# Patient Record
Sex: FEMALE | Race: WHITE | NOT HISPANIC OR LATINO
[De-identification: names, ages, dates, MRNs, and addresses within clinical notes are randomized per-mention and may not be internally consistent; named-entity substitution may affect disease eponyms.]

---

## 2022-06-23 DIAGNOSIS — Z00.00 ENCOUNTER FOR GENERAL ADULT MEDICAL EXAMINATION W/OUT ABNORMAL FINDINGS: ICD-10-CM

## 2022-06-27 ENCOUNTER — APPOINTMENT (OUTPATIENT)
Dept: OBGYN | Facility: CLINIC | Age: 77
End: 2022-06-27

## 2022-08-15 DIAGNOSIS — N95.1 MENOPAUSAL AND FEMALE CLIMACTERIC STATES: ICD-10-CM

## 2022-09-19 ENCOUNTER — APPOINTMENT (OUTPATIENT)
Dept: OBGYN | Facility: CLINIC | Age: 77
End: 2022-09-19

## 2022-11-08 ENCOUNTER — APPOINTMENT (OUTPATIENT)
Dept: OBGYN | Facility: CLINIC | Age: 77
End: 2022-11-08

## 2022-11-08 VITALS
WEIGHT: 144 LBS | HEART RATE: 61 BPM | DIASTOLIC BLOOD PRESSURE: 61 MMHG | BODY MASS INDEX: 23.14 KG/M2 | HEIGHT: 66 IN | SYSTOLIC BLOOD PRESSURE: 131 MMHG

## 2022-11-08 DIAGNOSIS — N76.0 ACUTE VAGINITIS: ICD-10-CM

## 2022-11-08 DIAGNOSIS — U07.1 COVID-19: ICD-10-CM

## 2022-11-08 PROCEDURE — G0101: CPT

## 2022-11-08 PROCEDURE — 99387 INIT PM E/M NEW PAT 65+ YRS: CPT | Mod: GY

## 2022-11-08 NOTE — HISTORY OF PRESENT ILLNESS
[FreeTextEntry1] : Patient is a 77-year-old  5 para 3-0-2-3 last menstrual period at age 43\par Patient presents for annual visit,,, complaining of some brown and yellow vaginal discharge and occasional pink-red spotting over the past couple of weeks

## 2022-11-08 NOTE — PHYSICAL EXAM
[Appropriately responsive] : appropriately responsive [Alert] : alert [No Acute Distress] : no acute distress [No Lymphadenopathy] : no lymphadenopathy [Regular Rate Rhythm] : regular rate rhythm [No Murmurs] : no murmurs [Clear to Auscultation B/L] : clear to auscultation bilaterally [Soft] : soft [Non-tender] : non-tender [Non-distended] : non-distended [No HSM] : No HSM [No Lesions] : no lesions [No Mass] : no mass [Oriented x3] : oriented x3 [Examination Of The Breasts] : a normal appearance [No Masses] : no breast masses were palpable [Vulvar Atrophy] : vulvar atrophy [Atrophy] : atrophy [Normal] : normal [Anteversion] : anteverted [Uterine Adnexae] : normal [Tenderness] : nontender [Mass ___ cm] : no uterine mass was palpated [FreeTextEntry1] : Severe atrophy [FreeTextEntry4] : Severe atrophy [FreeTextEntry5] : Flush with vaginal vault [FreeTextEntry6] : Small anteverted

## 2022-11-08 NOTE — PLAN
[FreeTextEntry1] : Patient is 77-year-old  5 para 3-0-2-3 last menstrual period age 43\par Patient presents for annual visit,,, states been having some vaginal discharge yellow and some spotting red blood over the past 2 weeks\par Physical exam reveals a well-developed well-nourished female no apparent distress,,, BMI 23\par Heart regular rhythm and rate, lungs clear, breast no mass nontender no skin is nipple discharge adenopathy, abdomen soft nontender no organomegaly\par Evidence of a skin lesion in the upper left abdominal wall area patient states has not changed in size shape or color at this time\par Pelvic exam shows normal female genitalia atrophic, vagina lesions atrophic, cervix flush to the vaginal vault nontender, uterus anteverted small nontender, adnexa no mass nontender.\par Pap smear performed since patient does express history of vaginal spotting over the past 2 weeks,,, to evaluate any possible cervical pathology\par Patient states she had a mammogram done in August of this year  and will forward results\par Patient will be given a prescription for bone density and also a pelvic sonogram to further evaluate the endometrial lining and the pelvic anatomy.  Patient states she was diagnosed with COVID back in  of this 2022 but denies any residual symptoms or deficits and denies being vaccinated\par Patient states she had colonoscopy 2 years prior with negative findings\par Patient will be contacted after pelvic sonogram results obtained for any further need of treatment for evaluation

## 2023-02-24 DIAGNOSIS — N95.0 POSTMENOPAUSAL BLEEDING: ICD-10-CM

## 2023-02-24 DIAGNOSIS — N85.02 ABNORMAL UTERINE AND VAGINAL BLEEDING, UNSPECIFIED: ICD-10-CM

## 2023-02-24 DIAGNOSIS — N93.9 ABNORMAL UTERINE AND VAGINAL BLEEDING, UNSPECIFIED: ICD-10-CM

## 2023-02-27 ENCOUNTER — APPOINTMENT (OUTPATIENT)
Dept: OBGYN | Facility: CLINIC | Age: 78
End: 2023-02-27
Payer: MEDICARE

## 2023-02-27 VITALS
BODY MASS INDEX: 24.27 KG/M2 | DIASTOLIC BLOOD PRESSURE: 71 MMHG | WEIGHT: 151 LBS | SYSTOLIC BLOOD PRESSURE: 130 MMHG | HEART RATE: 64 BPM | HEIGHT: 66 IN

## 2023-02-27 PROCEDURE — 58100 BIOPSY OF UTERUS LINING: CPT

## 2023-02-27 NOTE — PROCEDURE
[Endometrial Biopsy] : Endometrial biopsy [Thickened Endometrium] : thickened endometrium [Consent Obtained] : Consent obtained [Pre-op Evaluation] : Pre-op evaluation [Post-Menop. Bleeding] : post-menopausal bleeding [Risks] : risks [Alternatives] : alternatives [Bleeding] : bleeding [N/A] : pregnancy test not applicable [No Premedication] : No premedication [None] : none [Easy Passage] : Easy passage [Sounded to ___ cm] : sounded to [unfilled] ~Ucm [Anteverted] : anteverted [Scant] : scant [Specimen Collected] : collected [Sent to Pathology] : placed in buffered formalin and sent for pathology [Tolerated Well] : Patient tolerated the procedure well [No Complications] : No complications [de-identified] : At approximately age 43 [de-identified] : Single tooth tenaculum,,, endometrial Pipelle

## 2023-02-27 NOTE — ASSESSMENT
[FreeTextEntry1] : Patient is a 78-year-old  5 para 3-0-2-3, last menstrual period age 43\par Patient has been on HRT and has been experiencing some bleeding approximately 7 to 10 days/month over the past few months\par Pelvic sonogram shows uterus to be 7.5 x 4.3 x 3.2 with endometrial thickness of 6 mm\par Left ovary not seen\par Right ovary 1.6 x 1.0 x 1.0\par No abnormal vasculature or masses\par Patient placed in the exam room\par Findings of sonogram discussed extensively\par Consent obtained for endometrial sampling\par Patient placed in the dorsolithotomy position and sterile speculum space the vaginal vault\par Cervix and vaginal vault cleansed with Betadine solution\par Cervical anterior lip was grasped with a single-tooth tenaculum\par Pipelle endometrial plastic catheter introduced endometrial cavity with ease and patient tolerated well\par Small amount of tissue obtained and sent to pathology\par All instruments removed the vaginal vault\par No complications\par Patient tolerated well\par Follow-up 2 weeks to discuss results

## 2023-03-06 LAB — CORE LAB BIOPSY: NORMAL

## 2023-10-16 ENCOUNTER — RX RENEWAL (OUTPATIENT)
Age: 78
End: 2023-10-16

## 2023-10-16 RX ORDER — ESTRADIOL 1 MG/1
1 TABLET ORAL DAILY
Qty: 90 | Refills: 3 | Status: ACTIVE | COMMUNITY
Start: 2022-08-15 | End: 1900-01-01

## 2023-10-16 RX ORDER — PROGESTERONE 100 MG/1
100 CAPSULE ORAL
Qty: 90 | Refills: 3 | Status: ACTIVE | COMMUNITY
Start: 2022-08-15 | End: 1900-01-01

## 2024-10-10 ENCOUNTER — RX RENEWAL (OUTPATIENT)
Age: 79
End: 2024-10-10